# Patient Record
Sex: MALE
[De-identification: names, ages, dates, MRNs, and addresses within clinical notes are randomized per-mention and may not be internally consistent; named-entity substitution may affect disease eponyms.]

---

## 2021-03-23 ENCOUNTER — APPOINTMENT (OUTPATIENT)
Dept: HUMAN REPRODUCTION | Facility: CLINIC | Age: 41
End: 2021-03-23

## 2021-04-12 ENCOUNTER — APPOINTMENT (OUTPATIENT)
Dept: HUMAN REPRODUCTION | Facility: CLINIC | Age: 41
End: 2021-04-12
Payer: SELF-PAY

## 2021-04-12 PROCEDURE — 36415 COLL VENOUS BLD VENIPUNCTURE: CPT | Mod: NC

## 2021-09-13 ENCOUNTER — EMERGENCY (EMERGENCY)
Facility: HOSPITAL | Age: 41
LOS: 1 days | Discharge: HOME | End: 2021-09-13
Attending: EMERGENCY MEDICINE | Admitting: EMERGENCY MEDICINE
Payer: COMMERCIAL

## 2021-09-13 VITALS
HEART RATE: 101 BPM | OXYGEN SATURATION: 100 % | RESPIRATION RATE: 15 BRPM | TEMPERATURE: 98 F | DIASTOLIC BLOOD PRESSURE: 83 MMHG | SYSTOLIC BLOOD PRESSURE: 115 MMHG

## 2021-09-13 LAB
BASOPHILS # BLD AUTO: 0.04 K/UL — SIGNIFICANT CHANGE UP (ref 0–0.2)
BASOPHILS NFR BLD AUTO: 0.5 % — SIGNIFICANT CHANGE UP (ref 0–2)
EOSINOPHIL # BLD AUTO: 0.07 K/UL — SIGNIFICANT CHANGE UP (ref 0–0.5)
EOSINOPHIL NFR BLD AUTO: 0.8 % — SIGNIFICANT CHANGE UP (ref 0–6)
HCT VFR BLD CALC: 41.2 % — SIGNIFICANT CHANGE UP (ref 39–50)
HGB BLD-MCNC: 13.8 G/DL — SIGNIFICANT CHANGE UP (ref 13–17)
IANC: 6.13 K/UL — SIGNIFICANT CHANGE UP (ref 1.5–8.5)
IMM GRANULOCYTES NFR BLD AUTO: 0.5 % — SIGNIFICANT CHANGE UP (ref 0–1.5)
LYMPHOCYTES # BLD AUTO: 1.58 K/UL — SIGNIFICANT CHANGE UP (ref 1–3.3)
LYMPHOCYTES # BLD AUTO: 18.8 % — SIGNIFICANT CHANGE UP (ref 13–44)
MCHC RBC-ENTMCNC: 31.2 PG — SIGNIFICANT CHANGE UP (ref 27–34)
MCHC RBC-ENTMCNC: 33.5 GM/DL — SIGNIFICANT CHANGE UP (ref 32–36)
MCV RBC AUTO: 93.2 FL — SIGNIFICANT CHANGE UP (ref 80–100)
MONOCYTES # BLD AUTO: 0.54 K/UL — SIGNIFICANT CHANGE UP (ref 0–0.9)
MONOCYTES NFR BLD AUTO: 6.4 % — SIGNIFICANT CHANGE UP (ref 2–14)
NEUTROPHILS # BLD AUTO: 6.13 K/UL — SIGNIFICANT CHANGE UP (ref 1.8–7.4)
NEUTROPHILS NFR BLD AUTO: 73 % — SIGNIFICANT CHANGE UP (ref 43–77)
NRBC # BLD: 0 /100 WBCS — SIGNIFICANT CHANGE UP
NRBC # FLD: 0 K/UL — SIGNIFICANT CHANGE UP
PLATELET # BLD AUTO: 449 K/UL — HIGH (ref 150–400)
RBC # BLD: 4.42 M/UL — SIGNIFICANT CHANGE UP (ref 4.2–5.8)
RBC # FLD: 11.8 % — SIGNIFICANT CHANGE UP (ref 10.3–14.5)
WBC # BLD: 8.4 K/UL — SIGNIFICANT CHANGE UP (ref 3.8–10.5)
WBC # FLD AUTO: 8.4 K/UL — SIGNIFICANT CHANGE UP (ref 3.8–10.5)

## 2021-09-13 PROCEDURE — 99285 EMERGENCY DEPT VISIT HI MDM: CPT

## 2021-09-13 RX ORDER — MORPHINE SULFATE 50 MG/1
4 CAPSULE, EXTENDED RELEASE ORAL ONCE
Refills: 0 | Status: DISCONTINUED | OUTPATIENT
Start: 2021-09-13 | End: 2021-09-13

## 2021-09-13 RX ADMIN — MORPHINE SULFATE 4 MILLIGRAM(S): 50 CAPSULE, EXTENDED RELEASE ORAL at 21:44

## 2021-09-13 NOTE — ED ADULT NURSE NOTE - OBJECTIVE STATEMENT
41 yr old male pt presents to ED for evaluation of testicular pain. pt s/p vasectomy on 9/2 states he has been experiencing pain, swelling and "burning in groin area" since. pt axox4 appears uncomfortable, denies any cp sob fever chills n+v diarrhea hematuria or dysuria.

## 2021-09-13 NOTE — ED PROVIDER NOTE - PROGRESS NOTE DETAILS
Maria Isabel Baeza PGY-2: US with hydrocele, complex cystic lesion. Urology aware, will call back with recs. Patient still in pain, will give Toradol. U/A pending. Maria Isabel Baeza PGY-2: SPoke with urology. Can DC home with oxycodone, 7 days of cefdinir. To f/u with Dr. Dunham . Apt Friday.

## 2021-09-13 NOTE — ED PROVIDER NOTE - OBJECTIVE STATEMENT
41 year old M PSH of vasectomy over 10 years ago s/p vasectomy reversal 9/2 presenting with scrotal swelling and hematoma. Notes swelling, pain and hematoma to L testicle. Called his surgeon (not affiliated) who advised warm compresses to suture site to help with serosanguineous drainage, however has not given him any relief. Also notes dysuria, but no hematuria.

## 2021-09-13 NOTE — ED PROVIDER NOTE - ATTENDING CONTRIBUTION TO CARE
DR. BLOCH, ATTENDING MD-  I performed a face to face bedside interview with patient regarding history of present illness, review of symptoms and past medical history. I completed an independent physical exam.  I have discussed patient's plan of care with the resident.  Patient uncomfortable, heent nml, lungs clear, abd soft tender suprapubically, no CVA tenderness, extrem no edema, leg bag with pink urine DR. BLOCH, ATTENDING MD-  I performed a face to face bedside interview with patient regarding history of present illness, review of symptoms and past medical history. I completed an independent physical exam.  I have discussed patient's plan of care with the resident.  Patient uncomfortable, heent nml, lungs clear nontender, no CVA tenderness,  testicular exam very swollen testicles, worse on left with hematoma spreading to shaft of penis, extrem no edema.

## 2021-09-13 NOTE — ED PROVIDER NOTE - PHYSICAL EXAMINATION
GENERAL: Awake, alert, NAD  HEENT: NC/AT, moist mucous membranes, PERRL, EOMI  LUNGS: CTAB, no wheezes or crackles   CARDIAC: RRR, no m/r/g  ABDOMEN: Soft, normal BS, non tender, non distended, no rebound, no guarding  BACK: No midline spinal tenderness, no CVA tenderness  EXT: No edema, no calf tenderness, 2+ DP pulses bilaterally, no deformities.  NEURO: A&Ox3. Moving all extremities.  SKIN: Warm and dry. No rash.  PSYCH: Normal affect.   : Chaperoned by Dr. Bloch - significant L testicular swelling with hematoma to L testicle and L shaft of penis, healing sutures to L scrotum and L groin, no obvious bleeding or pus drainage

## 2021-09-13 NOTE — ED PROVIDER NOTE - CARE PROVIDER_API CALL
Lowell Dunham (MD)  Urology  [address], [address]  [place] [address]  Phone: [phone]  Fax: [phone]  Scheduled Appointment: 09/17/2021

## 2021-09-13 NOTE — ED ADULT TRIAGE NOTE - CHIEF COMPLAINT QUOTE
Pt. c/o pain to testicle s/p vasectomy 9/2. Patient denies any hematuria, or burning when urinating. Patient states there is swelling to the area and there is a hematoma. pain is unrelieved by Motrin. Denies any past medical history.

## 2021-09-13 NOTE — ED PROVIDER NOTE - NSFOLLOWUPINSTRUCTIONS_ED_ALL_ED_FT
Please follow up with Dr. Dunham (). Call tomorrow to make an appointment for Friday.     A prescription for antibiotics have been sent to your pharmacy, as well as some pain medications. Please take these  as indicated on the bottle.    Please return to the emergency department with any new or worsening symptoms, including:  -Severe pain  -Inability to urinate  -Blood in the urine  -High fevers

## 2021-09-13 NOTE — ED PROVIDER NOTE - NS ED ROS FT
CONST: no fevers, no chills  EYES: no pain, no vision changes  ENT: no sore throat, no ear pain, no change in hearing  CV: no chest pain, no leg swelling  RESP: no shortness of breath, no cough  ABD: no abdominal pain, no nausea, no vomiting, no diarrhea  : + dysuria, no flank pain, no hematuria, +scrotal swelling, +scrotal hematoma  MSK: no back pain, no extremity pain  NEURO: no headache or additional neurologic complaints  HEME: no easy bleeding  SKIN:  no rash

## 2021-09-13 NOTE — ED PROVIDER NOTE - PATIENT PORTAL LINK FT
You can access the FollowMyHealth Patient Portal offered by [facility] by registering at the following website: [email or web address]. By joining [employer]’s FollowMyHealth portal, you will also be able to view your health information using other applications (apps) compatible with our system.

## 2021-09-13 NOTE — ED ADULT NURSE NOTE - NSIMPLEMENTINTERV_GEN_ALL_ED
Implemented All Universal Safety Interventions:  [place] to call system. Call bell, personal items and telephone within reach. Instruct patient to call for assistance. Room bathroom lighting operational. Non-slip footwear when patient is off stretcher. Physically safe environment: no spills, clutter or unnecessary equipment. Stretcher in lowest position, wheels locked, appropriate side rails in place.

## 2021-09-14 VITALS
TEMPERATURE: 98 F | OXYGEN SATURATION: 99 % | SYSTOLIC BLOOD PRESSURE: 118 MMHG | RESPIRATION RATE: 16 BRPM | HEART RATE: 79 BPM | DIASTOLIC BLOOD PRESSURE: 72 MMHG

## 2021-09-14 LAB
ALBUMIN SERPL ELPH-MCNC: 4.4 G/DL — SIGNIFICANT CHANGE UP (ref 3.3–5)
ALP SERPL-CCNC: 104 U/L — SIGNIFICANT CHANGE UP (ref 40–120)
ALT FLD-CCNC: 18 U/L — SIGNIFICANT CHANGE UP (ref 4–41)
ANION GAP SERPL CALC-SCNC: 15 MMOL/L — HIGH (ref 7–14)
APPEARANCE UR: CLEAR — SIGNIFICANT CHANGE UP
AST SERPL-CCNC: 17 U/L — SIGNIFICANT CHANGE UP (ref 4–40)
BACTERIA # UR AUTO: NEGATIVE — SIGNIFICANT CHANGE UP
BILIRUB SERPL-MCNC: 0.5 MG/DL — SIGNIFICANT CHANGE UP (ref 0.2–1.2)
BILIRUB UR-MCNC: NEGATIVE — SIGNIFICANT CHANGE UP
BUN SERPL-MCNC: 10 MG/DL — SIGNIFICANT CHANGE UP (ref 7–23)
CALCIUM SERPL-MCNC: 9.3 MG/DL — SIGNIFICANT CHANGE UP (ref 8.4–10.5)
CHLORIDE SERPL-SCNC: 100 MMOL/L — SIGNIFICANT CHANGE UP (ref 98–107)
CO2 SERPL-SCNC: 25 MMOL/L — SIGNIFICANT CHANGE UP (ref 22–31)
COLOR SPEC: YELLOW — SIGNIFICANT CHANGE UP
CREAT SERPL-MCNC: 0.77 MG/DL — SIGNIFICANT CHANGE UP (ref 0.5–1.3)
DIFF PNL FLD: ABNORMAL
EPI CELLS # UR: 0 /HPF — SIGNIFICANT CHANGE UP (ref 0–5)
GLUCOSE SERPL-MCNC: 70 MG/DL — SIGNIFICANT CHANGE UP (ref 70–99)
GLUCOSE UR QL: NEGATIVE — SIGNIFICANT CHANGE UP
HYALINE CASTS # UR AUTO: 2 /LPF — SIGNIFICANT CHANGE UP (ref 0–7)
KETONES UR-MCNC: ABNORMAL
LEUKOCYTE ESTERASE UR-ACNC: NEGATIVE — SIGNIFICANT CHANGE UP
NITRITE UR-MCNC: NEGATIVE — SIGNIFICANT CHANGE UP
PH UR: 6.5 — SIGNIFICANT CHANGE UP (ref 5–8)
POTASSIUM SERPL-MCNC: 4.4 MMOL/L — SIGNIFICANT CHANGE UP (ref 3.5–5.3)
POTASSIUM SERPL-SCNC: 4.4 MMOL/L — SIGNIFICANT CHANGE UP (ref 3.5–5.3)
PROT SERPL-MCNC: 7.8 G/DL — SIGNIFICANT CHANGE UP (ref 6–8.3)
PROT UR-MCNC: ABNORMAL
RBC CASTS # UR COMP ASSIST: 10 /HPF — HIGH (ref 0–4)
SARS-COV-2 RNA SPEC QL NAA+PROBE: SIGNIFICANT CHANGE UP
SODIUM SERPL-SCNC: 140 MMOL/L — SIGNIFICANT CHANGE UP (ref 135–145)
SP GR SPEC: 1.02 — SIGNIFICANT CHANGE UP (ref 1–1.05)
UROBILINOGEN FLD QL: SIGNIFICANT CHANGE UP
WBC UR QL: 1 /HPF — SIGNIFICANT CHANGE UP (ref 0–5)

## 2021-09-14 PROCEDURE — 76870 US EXAM SCROTUM: CPT | Mod: 26

## 2021-09-14 RX ORDER — KETOROLAC TROMETHAMINE 30 MG/ML
15 SYRINGE (ML) INJECTION ONCE
Refills: 0 | Status: DISCONTINUED | OUTPATIENT
Start: 2021-09-14 | End: 2021-09-14

## 2021-09-14 RX ORDER — OXYCODONE HYDROCHLORIDE 5 MG/1
1 TABLET ORAL
Qty: 12 | Refills: 0
Start: 2021-09-14 | End: 2021-09-16

## 2021-09-14 RX ORDER — CEFDINIR 250 MG/5ML
1 POWDER, FOR SUSPENSION ORAL
Qty: 14 | Refills: 0
Start: 2021-09-14 | End: 2021-09-20

## 2021-09-14 RX ADMIN — Medication 15 MILLIGRAM(S): at 02:37

## 2021-09-14 RX ADMIN — Medication 15 MILLIGRAM(S): at 01:39

## 2021-09-14 RX ADMIN — MORPHINE SULFATE 4 MILLIGRAM(S): 50 CAPSULE, EXTENDED RELEASE ORAL at 02:37

## 2021-09-14 NOTE — CONSULT NOTE ADULT - ASSESSMENT
41 year old M PSH of vasectomy over 10 years ago s/p vasectomy reversal 9/2 presented with left scrotal swelling and hematoma which was tender to palpation on exam. Patient has been afebrile, with a low WBC count of 8.40. Scrotal U/S was recommended with findings above consistent with hematoma and postsurgical changes.   - no acute urological intervention warranted at this time  - pain control with more than just motrin, recommend oxycodone  - Recommend cefdinir for 7 days  - Patient can be discharged home to follow up with Dr. Dunham outpatient on Friday in his office  - Call [phone] to make an appointment with Dr. Dunham for Friday  - If symptoms worsen between discharge and friday, should call Dr. Dunham's office  - Plan discussed with Dr. Dunham

## 2021-09-14 NOTE — CONSULT NOTE ADULT - SUBJECTIVE AND OBJECTIVE BOX
HPI  41 year old M PSH of vasectomy over 10 years ago s/p vasectomy reversal  in [place] presenting with scrotal swelling and hematoma. Notes swelling, pain and hematoma to L testicle. Called his surgeon (not affiliated) who advised warm compresses to suture site to help with serosanguineous drainage, however has not given him any relief. Also notes dysuria, but no hematuria.    Urology was consulted due to tender left testicle with swelling. Recommended U/S. Patient states that he had 2 children in a prior marriage and is getting a reversal since he is getting  again. Denies any urological history or any other past medical history. Complains of pain but no dysuria and no issues with voiding or emptying his bladder. Patient reported that he had a temperature of 100F on Thursday night and Friday night but nothing significant. He states that the swelling has not changed over the past few days but he came in today due to pain as well as bloody drainage from the wound on the left testicle. Patient has only been taking motrin for pain.     PAST MEDICAL & SURGICAL HISTORY:      MEDICATIONS  (STANDING):    MEDICATIONS  (PRN):      FAMILY HISTORY:      Allergies    No Known Allergies    Intolerances        SOCIAL HISTORY:    REVIEW OF SYSTEMS: Otherwise negative as stated in HPI    Physical Exam  Vital signs  T(C): 36.6 (21 @ 21:36), Max: 36.9 (21 @ 20:21)  HR: 78 (21 @ 21:36)  BP: 109/71 (21 @ 21:36)  SpO2: 100% (21 @ 21:36)  Wt(kg): --    Output      Gen: NAD  Pulm: No respiratory distress	  CV: RRR  GI: S/ND/NT  : Right testicle is nontender with no swelling. Wound on lateral side of right scrotum is clean, dry, and intact. Left testicle is swollen, nonerythematous, and tender to palpation. Serosanguinous drainage noted from wound on lateral side of left scrotum.   MSK: moves all 4 limbs spontaneously    LABS:       @ 00:37    WBC --    / Hct --    / SCr 0.77      @ 22:24    WBC 8.40  / Hct 41.2  / SCr --           140  |  100  |  10  ----------------------------<  70  4.4   |  25  |  0.77    Ca    9.3      14 Sep 2021 00:37    TPro  7.8  /  Alb  4.4  /  TBili  0.5  /  DBili  x   /  AST  17  /  ALT  18  /  AlkPhos  104        Urinalysis Basic - ( 14 Sep 2021 01:49 )    Color: Yellow / Appearance: Clear / S.021 / pH: x  Gluc: x / Ketone: Small  / Bili: Negative / Urobili: <2 mg/dL   Blood: x / Protein: Trace / Nitrite: Negative   Leuk Esterase: Negative / RBC: 10 /HPF / WBC 1 /HPF   Sq Epi: x / Non Sq Epi: 0 /HPF / Bacteria: Negative      RADIOLOGY:  < from: US Testicles (21 @ 00:21) >    EXAM:  US SCROTUM AND CONTENTS        PROCEDURE DATE:  Sep 14 2021         INTERPRETATION:  CLINICAL INFORMATION: Left-sided testicular pain and swelling. Status post vasectomy reversal.    COMPARISON: None available.    TECHNIQUE: Testicular ultrasound utilizing color and spectral Doppler.    FINDINGS:    RIGHT:  Right testis: 5.0 (cm) x 2.7 (cm) x  3.0 (cm). Normal echogenicity and echotexture with no masses or areas of architectural distortion. Normal arterial and venous blood flow pattern.  Right epididymis: Within normal limits.  Right hydrocele: None.  Right varicocele: None.    LEFT:  Left testis: 3.8 (cm) x 3.0 (cm) x 3.2 (cm). Normal echogenicity and echotexture with no masses or areas of architectural distortion. Normal arterial and venous blood flow pattern.  Left epididymis: Subcentimeter cyst.  Left hydrocele: Heterogeneous collection surrounding the testicle measuring 6.8 x 3.9 x 5.9 cm, compatible with hematoma. Complex cystic collection superior to the testicle measuring7.1 x 2.9 x 5.2 cm.  Left varicocele: None.    IMPRESSION:    Large complex left hydrocele, likely hemorrhagic. Normal testis.  Left scrotal wall hematoma.        --- End of Report ---            LATOYA SHERMAN MD; Resident Radiology  This documenthas been electronically signed.  MJ ARELLANO MD; Attending Radiologist  This document has been electronically signed. Sep 14 2021  1:05AM    < end of copied text >

## 2021-09-15 LAB
CULTURE RESULTS: SIGNIFICANT CHANGE UP
SPECIMEN SOURCE: SIGNIFICANT CHANGE UP

## 2021-09-20 PROBLEM — Z00.00 ENCOUNTER FOR PREVENTIVE HEALTH EXAMINATION: Noted: 2021-09-20

## 2021-09-20 PROBLEM — Z00.00 ENCOUNTER FOR PREVENTIVE HEALTH EXAMINATION: Status: ACTIVE | Noted: 2021-09-20

## 2021-09-23 ENCOUNTER — APPOINTMENT (OUTPATIENT)
Dept: ULTRASOUND IMAGING | Facility: CLINIC | Age: 41
End: 2021-09-23
Payer: COMMERCIAL

## 2021-09-23 PROCEDURE — 93975 VASCULAR STUDY: CPT

## 2021-10-13 NOTE — ED ADULT TRIAGE NOTE - ARRIVAL FROM
Patient seen for CPE for new patient exam.    1. Have you been to the ER, urgent care clinic since your last visit? Hospitalized since your last visit? No    2. Have you seen or consulted any other health care providers outside of the [address] [place] [address] since your last visit? Include any pap smears or colon screening. No     Health Maintenance Due   Topic Date Due    Hepatitis B Peds Age 0-24 (1 of 3 - 3-dose primary series) Never done    Hepatitis C Screening  Never done    Varicella Peds Age 1-18 (1 of 2 - 2-dose childhood series) Never done    Hepatitis A Peds Age 1-18 (1 of 2 - 2-dose series) Never done    MMR Peds Age 1-18 (1 of 2 - Standard series) Never done    DTaP/Tdap/Td series (1 - Tdap) Never done    HPV Age 9Y-34Y (3 - Male 2-dose series) Never done    MCV through Age 25 (1 - 2-dose series) Never done     Patient was given VIS for review,consent was obtained and per orders of Dr. Isael Cruz, [facility] observed. No signs nor symptoms of any adverse reactions. Patient tolerated injection well. Home

## 2025-01-29 NOTE — ED PROVIDER NOTE - CLINICAL SUMMARY MEDICAL DECISION MAKING FREE TEXT BOX
show Exam with significant L sided scrotal edema, hematoma, healing sutures. Most likely complication 2/2 vasectomy reversal. However in setting of dysuria, will r/o UTI, hematuria. Low suspicion for torsion or epididymitis. Will get testicular US, basic labs, pain control, urology consult.